# Patient Record
Sex: FEMALE
[De-identification: names, ages, dates, MRNs, and addresses within clinical notes are randomized per-mention and may not be internally consistent; named-entity substitution may affect disease eponyms.]

---

## 2022-01-01 ENCOUNTER — RX ONLY (OUTPATIENT)
Age: 27
Setting detail: RX ONLY
End: 2022-01-01

## 2022-11-12 ENCOUNTER — RX ONLY (OUTPATIENT)
Age: 27
Setting detail: RX ONLY
End: 2022-11-12

## 2022-11-22 ENCOUNTER — APPOINTMENT (RX ONLY)
Dept: URBAN - METROPOLITAN AREA OTHER 5 | Facility: OTHER | Age: 27
Setting detail: DERMATOLOGY
End: 2022-11-22

## 2022-11-22 PROBLEM — D23.62 OTHER BENIGN NEOPLASM OF SKIN OF LEFT UPPER LIMB, INCLUDING SHOULDER: Status: ACTIVE | Noted: 2022-11-22

## 2022-11-22 PROCEDURE — ? ADDITIONAL NOTES

## 2022-11-22 PROCEDURE — 99202 OFFICE O/P NEW SF 15 MIN: CPT

## 2022-11-22 PROCEDURE — ? COUNSELING

## 2022-11-22 NOTE — PROCEDURE: ADDITIONAL NOTES
Detail Level: Simple
Additional Notes: Discussed with patient options for removal. Reassured her that lesion is not malignant and she should schedule an appointment if it enlarges.
Render Risk Assessment In Note?: no